# Patient Record
Sex: FEMALE | Race: WHITE | HISPANIC OR LATINO | Employment: UNEMPLOYED | URBAN - METROPOLITAN AREA
[De-identification: names, ages, dates, MRNs, and addresses within clinical notes are randomized per-mention and may not be internally consistent; named-entity substitution may affect disease eponyms.]

---

## 2021-05-09 ENCOUNTER — HOSPITAL ENCOUNTER (EMERGENCY)
Facility: MEDICAL CENTER | Age: 31
End: 2021-05-09
Attending: EMERGENCY MEDICINE

## 2021-05-09 VITALS
TEMPERATURE: 99.1 F | BODY MASS INDEX: 31.39 KG/M2 | RESPIRATION RATE: 16 BRPM | OXYGEN SATURATION: 98 % | HEART RATE: 78 BPM | WEIGHT: 183.86 LBS | HEIGHT: 64 IN | SYSTOLIC BLOOD PRESSURE: 135 MMHG | DIASTOLIC BLOOD PRESSURE: 81 MMHG

## 2021-05-09 DIAGNOSIS — T19.2XXA FOREIGN BODY IN VAGINA, INITIAL ENCOUNTER: ICD-10-CM

## 2021-05-09 DIAGNOSIS — Z71.1 PHYSICALLY WELL BUT WORRIED: ICD-10-CM

## 2021-05-09 PROCEDURE — 99284 EMERGENCY DEPT VISIT MOD MDM: CPT

## 2021-05-09 NOTE — ED NOTES
Discharge teaching for possible FB provided to patient. Reviewed home care, importance of hydration and when to return to ED with worsening symptoms. Instructed on importance of follow up care with [facility], Emergency Dept  [facility] [facility]  [place] [address] [phone]         All questions answered, patient verbalizes understanding to all teaching. Copy of discharge paperwork provided. Signed copy in chart. Armband removed. Pt alert, pink, interactive and in NAD. Ambulatory out of department in stable condition.

## 2021-05-09 NOTE — ED NOTES
Pt ambulatory to [facility]. Agree with triage RN note. Instructed to change into gown. Pt awake and alert. Reports she placed a tampon 1 week ago and cannot recall if she removed it. She denies abd pain, pelvic pain, drainage or other complaints. Call light within reach. Denies additional needs.     RN to bedside with ERP to assist with pelvic exam

## 2021-05-09 NOTE — ED PROVIDER NOTES
"ED Provider Note    CHIEF COMPLAINT  Chief Complaint   Patient presents with   • Foreign Body in Vagina     pt unsure if tampon was removed after being placed last sunday, 5/2       HPI  Brandy Glasgow is a 30 y.o. female who presents for evaluation of a possible tampon in her vagina, about a week ago she had a tampon up there but does not recall taking out.  She has no symptoms, no pain or discharge or malodorous concerns.  No abdominal pain.  No fever.  No burning or blood with urination.    REVIEW OF SYSTEMS  Negative for fever, rash, chest pain, dyspnea, abdominal pain, back pain. All other systems are negative.     PAST MEDICAL HISTORY       SOCIAL HISTORY  Social History     Tobacco Use   • Smoking status: Never Smoker   • Smokeless tobacco: Never Used   Substance and Sexual Activity   • Alcohol use: Not Currently   • Drug use: Never   • Sexual activity: Not on file       SURGICAL HISTORY  patient denies any surgical history    CURRENT MEDICATIONS  I personally reviewed the medication list in the charting documentation.     ALLERGIES  Allergies   Allergen Reactions   • Pcn [Penicillins] Unspecified     Pt reports \"my mom just told me, I'm not really sure.\"       PHYSICAL EXAM  VITAL SIGNS: /74   Pulse 82   Temp 36.7 °C (98.1 °F) (Temporal)   Resp 17   Ht 1.626 m (5' 4\")   Wt 83.4 kg (183 lb 13.8 oz)   LMP 05/02/2021   SpO2 98%   BMI 31.56 kg/m²   Constitutional: Well appearing patient in no acute distress.  Awake and alert, not toxic nor ill in appearance.  HENT: Normocephalic, no obvious evidence of acute trauma.   Neck: Comfortable movement without any obvious restriction in the range of motion.  Eyes: Conjunctiva normal, Non-icteric.   Chest: Normal nonlabored respirations.  Skin: The exposed portions of skin reveal no obvious rash or other abnormalities.  Musculoskeletal: No obvious restriction in the range of motion in all major joints.   : Unremarkable external genitalia.  " Speculum examination reveals no foreign bodies.  Bimanual evaluation reveals no foreign bodies.  Neurologic: Alert, No obvious focal deficits noted.   Psychiatric: Affect normal for clinical presentation    COURSE & MEDICAL DECISION MAKING  Pertinent Labs & Imaging studies reviewed. (See chart for details)    Encounter Summary: This is a very pleasant 30 y.o. female who unfortunately required evaluation in the emergency department today with concerns over a retained tampon from nearly a week ago, no symptoms, no evidence of foreign body on exam.  Discharged home in stable condition      DISPOSITION: Discharge Home      FINAL IMPRESSION  1. Physically well but worried    2. Foreign body in vagina, initial encounter        This dictation was created using voice recognition software. The accuracy of the dictation is limited to the abilities of the software. I expect there may be some errors of grammar and possibly content. The nursing notes were reviewed and certain aspects of this information were incorporated into this note.    Electronically signed by: Jonathan Groves M.D., 5/9/2021 4:22 PM

## 2021-05-09 NOTE — ED TRIAGE NOTES
"Brandy Glasgow  Chief Complaint   Patient presents with   • Foreign Body in Vagina     pt unsure if tampon was removed after being placed last sunday, 5/2     Pt ambulatory to triage with above complaint. Pt states \"I don't remember taking it out and just want to make sure.\" Pt denies any discharge, odor, or pain.     /74   Pulse 82   Temp 36.7 °C (98.1 °F) (Temporal)   Resp 17   Ht 1.626 m (5' 4\")   Wt 83.4 kg (183 lb 13.8 oz)   LMP 05/02/2021   SpO2 98%   BMI 31.56 kg/m²     Pt informed of triage process and encouraged to notify staff of any changes or concerns. Pt verbalized understanding of instructions. Apologized for long wait time. Pt placed back in lobby.       "